# Patient Record
Sex: FEMALE | Race: WHITE
[De-identification: names, ages, dates, MRNs, and addresses within clinical notes are randomized per-mention and may not be internally consistent; named-entity substitution may affect disease eponyms.]

---

## 2022-09-13 ENCOUNTER — HOSPITAL ENCOUNTER (EMERGENCY)
Dept: HOSPITAL 26 - MED | Age: 82
Discharge: HOME | End: 2022-09-13
Payer: COMMERCIAL

## 2022-09-13 VITALS — WEIGHT: 178 LBS | HEIGHT: 63 IN | BODY MASS INDEX: 31.54 KG/M2

## 2022-09-13 VITALS — DIASTOLIC BLOOD PRESSURE: 68 MMHG | SYSTOLIC BLOOD PRESSURE: 112 MMHG

## 2022-09-13 DIAGNOSIS — E11.9: ICD-10-CM

## 2022-09-13 DIAGNOSIS — I10: ICD-10-CM

## 2022-09-13 DIAGNOSIS — Z79.4: ICD-10-CM

## 2022-09-13 DIAGNOSIS — U07.1: Primary | ICD-10-CM

## 2022-09-13 DIAGNOSIS — Z79.899: ICD-10-CM

## 2022-09-13 DIAGNOSIS — E03.9: ICD-10-CM

## 2022-09-13 DIAGNOSIS — K21.9: ICD-10-CM

## 2022-09-13 NOTE — NUR
Patient discharged with v/s stable. Written and verbal after care instructions 
given and explained. 

Patient alert, oriented and verbalized understanding of instructions. 
Ambulatory with steady gait. All questions addressed prior to discharge. ID 
band removed. Patient advised to follow up with PMD. Rx of PAXLOVID CO-PACK 
given. Patient educated on indication of medication including possible reaction 
and side effects. Opportunity to ask questions provided and answered.

## 2022-09-13 NOTE — NUR
83 Y/O FEMALE BIB C/O COVID 19, POSITIVE 3DAYS AGO, TOOK TYLENOL FOR FEVER AT 
0900, TEMP IN TRIAGE 98.3 TEMPORAL, DENIES COUGH, SOB



NKA

PMH: HTN, HYPOTHYROID, HDL